# Patient Record
(demographics unavailable — no encounter records)

---

## 2025-05-15 NOTE — REASON FOR VISIT
[Initial Consultation] : an initial consultation [Epididymal Cyst] : epididymal cyst [Parents] : parents

## 2025-06-04 NOTE — ASSESSMENT
[FreeTextEntry1] : NVAID  has a very small epididymal cyst. These are very common benign findings. I discussed the nature of epididymal cysts and their benign course. They can grow to be large sizes and occasionally causes discomfort. Surgery is typically not indicated except for those 2 relative indications. They understand the surgery can cause damage to the epididymis and subsequent fertility issues and therefore we try to avoid surgery unless absolute necessary. All questions were answered.

## 2025-06-04 NOTE — PHYSICAL EXAM
[de-identified] : Left foot Physical Examination:  General: Alert and oriented x3.  In no acute distress.  Pleasant in nature with a normal affect.  No apparent respiratory distress.  Erythema, Warmth, Rubor: Negative Swelling: + swelling of the 2nd toe  ROM Ankle: 1. Dorsiflexion: 10 degrees 2. Plantarflexion: 40 degrees 3. Inversion: 30 degrees 4. Eversion: 20 degrees  ROM of digits: Normal  Pes Planus: Negative Pes Cavus: Negative  Bunion: Negative Tailor's Bunion (Bunionette): Negative Hammer Toe Deformity/Deformities: Negative  Tenderness to Palpation:  1. Heel Pain: Negative 2. Midfoot Pain: Negative 3. First MTP Joint: Negative 4. Lis Franc Joint: Negative  Tenderness Metatarsals: 1st MT: Negative 2nd MT: Negative 3rd MT: Negative 4th MT: Negative 5th MT: Negative Base of the 5th MT: Negative  Ligament Pain: 1. Lis Franc Ligament: Negative 2. Plantar Fascia Ligament: Negative  Strength:  5/5 TA/GS/EHL/FHL/EDL/ADD/ABD  Pulses: 2+ DP/PT Pulses  Capillary Refill Toes: <2 seconds  Neuro: Intact motor and sensory throughout  Additional Test: 1. Melvin's Squeeze Test: Negative 2. Calcaneal Squeeze Test: Negative  Positive pain when palpating the proximal phalanx of the 2nd toe.  All toes are in good anatomical alignment.   [de-identified] : 3 views x-rays left foot taken at [facility] in [place] on 5/30/2025: nondisplaced intraarticular fracture of the proximal phalanx of the 2nd toe.

## 2025-06-04 NOTE — HISTORY OF PRESENT ILLNESS
[FreeTextEntry1] : The patient is a 14-year-old male who presents with a left foot 2nd toe proximal phalanx fracture after he fell off a [employer]ooter on Friday, 5/30/25.  He went to the urgent care that night, x-rays were taken.  He presents wearing a medical grade hard soled shoe, pain controlled.  Presents with his mother in office today.

## 2025-06-04 NOTE — ASSESSMENT
[FreeTextEntry1] : NAVID  has a very small epididymal cyst. These are very common benign findings. I discussed the nature of epididymal cysts and their benign course. They can grow to be large sizes and occasionally causes discomfort. Surgery is typically not indicated except for those 2 relative indications. They understand the surgery can cause damage to the epididymis and subsequent fertility issues and therefore we try to avoid surgery unless absolute necessary. All questions were answered.

## 2025-06-04 NOTE — CONSULT LETTER
[FreeTextEntry1] : Dear Dr. NAOMI TERESA,      I had the pleasure of consulting on NAVID CARLOS today.  Below is my note regarding the office visit today.      Thank you so very much for allowing me to participate in NAVID's care.  Please don't hesitate to call me should any questions or issues arise.        Sincerely,     Manish Hall MD, FACS, [facility]    Chief, Pediatric Urology    Professor of Urology and Pediatrics    [employer]        President, [employer] - [place] Section    Past-President, Societies [employer]

## 2025-06-04 NOTE — HISTORY OF PRESENT ILLNESS
[TextBox_4] : NAVID presents today for an initial consultation. Presented to PCP on 4/4/25 for diarrhea, vomiting, headache, and left testicular swelling. Upon exam, noted to have a slightly enlarged spermatic cord. Scrotal ultrasound (4/4/25) demonstrated a 0.4 cm left epididymal head cyst (personally reviewed the images and report)

## 2025-06-04 NOTE — DISCUSSION/SUMMARY
[de-identified] : Continue with the medical grade hard soled shoe to protect the fracture.  He can weight-bear as tolerated with the shoe on.  Avoid walking barefoot.  Activity modifications remain in effect due to the fracture.  I explained to him and his mother that total fracture healing time is approximately 6 weeks from the date of injury.  Nonsurgical fracture care.  Patient will follow-up in 2 to 3 weeks for new x-rays and continued care for the fracture.  If anything changes, return to office sooner.  All of his questions were answered.  All of his mother's questions were answered.  No gym and no sports until the fracture is healed.  They both understood and agreed to the treatment course.

## 2025-06-04 NOTE — CONSULT LETTER
[FreeTextEntry1] : Dear Dr. NAOMI TERESA,      I had the pleasure of consulting on NAVID CARLOS today.  Below is my note regarding the office visit today.      Thank you so very much for allowing me to participate in NAVID's care.  Please don't hesitate to call me should any questions or issues arise.        Sincerely,     Manish Hall MD, FACS, [facility]    Chief, Pediatric Urology    Professor of Urology and Pediatrics    [employer]        President, [employer] Association - [place] Section    Past-President, Societies [employer] Pediatric [employer]

## 2025-06-04 NOTE — CONSULT LETTER
[FreeTextEntry1] : Dear Dr. NAOMI TERESA,      I had the pleasure of consulting on NAVID ACRLOS today.  Below is my note regarding the office visit today.      Thank you so very much for allowing me to participate in NAVID's care.  Please don't hesitate to call me should any questions or issues arise.        Sincerely,     Manish Hall MD, FACS, [facility]    Chief, Pediatric Urology    Professor of Urology and Pediatrics    [employer]        President, [employer] Association - [place] Section    Past-President, Societies [employer] Pediatric U[employer]

## 2025-06-04 NOTE — PHYSICAL EXAM
[TextBox_92] : PENIS: Straight protuberant penis.  Meatus ample size in orthotopic position.   SCROTUM: Symmetric testes in dependent position without palpable mass, hernia, hydrocele or varicocele .  Very small left epididymal cyst

## 2025-06-20 NOTE — DISCUSSION/SUMMARY
[de-identified] : Today I had a lengthy discussion with the patient regarding their left foot pain. I have addressed all the patient's concerns surrounding the pathology of their condition. I have reviewed the patient's left foot XR imaging with them in great detail. We will continue with conservative treatment.  I recommend that the patient utilize ice, NSAIDS PRN, and heat. They can also elevate their LLE above the level of the heart. The patient can transition out of the medical stiff shoe into sneakers. A discussion was had about shoe-wear modifications. I advised the patient to utilize a wide toed cross training sneaker that better accommodates the feet. I recommended New Balance, Harrell, or Hoka to the patient. The patient can resume normal activities in 2-3 weeks.   The patient understood and verbally agreed to the treatment plan. All of their questions were answered and they were satisfied with the visit. The patient should call the office if they have any questions or experience worsening symptoms.   F/U prn

## 2025-06-20 NOTE — ADDENDUM
[FreeTextEntry1] : I, Maynor Goel, acted solely as a scribe for Dr. Rudy Cantu on this date 06/20/2025  .   All medical record entries made by the Scribe were at my, Dr. Rudy Cantu, direction and personally dictated by me on 06/20/2025 . I have reviewed the chart and agree that the record accurately reflects my personal performance of the history, physical exam, assessment and plan. I have also personally directed, reviewed, and agreed with the chart.

## 2025-06-20 NOTE — HISTORY OF PRESENT ILLNESS
[FreeTextEntry1] :  06/20/2025: NAVID CARLOS is a 14 year old male presenting to the office with his mother for a follow up evaluation of his left foot 2nd toe proximal phalanx fracture. He reports that his symptoms have improved, and his pain scale is a 2/10. He desires to transition out of the medical stiff shoe that he presents to the office in today.  The patient is a 14-year-old male who presents with a left foot 2nd toe proximal phalanx fracture after he fell off a [employer] on Friday, 5/30/25.  He went to the urgent care that night, x-rays were taken.  He presents wearing a medical grade hard soled shoe, pain controlled.  Presents with his mother in office today.

## 2025-06-20 NOTE — PHYSICAL EXAM
[de-identified] :  L foot Physical Examination:     General: Alert and oriented x3.  In no acute distress.  Pleasant in nature with a normal affect.  No apparent respiratory distress.   Erythema, Warmth, Rubor: Negative   Swelling: Negative       ROM Ankle:   1. Dorsiflexion: 10 degrees   2. Plantarflexion: 40 degrees   3. Inversion: 20 degrees   4. Eversion: 20 degrees       ROM of digits: Normal   Pes Planus: Negative   Pes Cavus: Negative       Bunion: Negative   Tailor's Bunion (Bunionette): Negative   Hammer Toe Deformity/Deformities: Negative       Tenderness to Palpation:   1. Heel Pain: Negative   2. Midfoot Pain: Negative   3. First MTP Joint: Negative   4. Lis Franc Joint: Negative   5. Lateral Malleolus: Negative   6. Medial Malleolus: Negative       Tenderness Metatarsals:   1st MT: Negative   2nd MT: Negative   3rd MT: Negative   4th MT: Negative   5th MT: Negative   Base of the 5th MT: Negative       Tendon Pain:   1. Posterior Tibialis: Negative   2. Peroneus Brevis/Longus: Negative       Ligament Pain:   1. Lis Franc Ligament: Negative   2. Plantar Fascia Ligament: Negative   3. ATFL/CFL/PTFL: Negative   4. Deltoid Ligaments: Negative       Stability:   1. Anterior Drawer: Negative   2. Posterior Drawer: Negative       Strength:   5/5 TA/GS/EHL/FHL/EDL/ADD/ABD       Pulses: 2+ DP/PT Pulses       Capillary Refill Toes: <2 seconds       Neuro: Intact motor and sensory throughout       Additional Test:   1. Melvin's Squeeze Test: Negative   2. Calcaneal Squeeze Test: Negative   3. Turk Test: Negative   4. Tarsal Tunnel Tinel's Sign (Posterior Tibial Nerve Impingement): Negative   5. Single Heel Rise: Negative  [de-identified] : 3V of the left foot were ordered, obtained and reviewed by me today, 06/20/2025 , and revealed: healing nondisplaced intraarticular fracture of the proximal phalanx of the 2nd toe.    3 views x-rays left foot taken at [facility] in [place] on 5/30/2025: nondisplaced intraarticular fracture of the proximal phalanx of the 2nd toe.